# Patient Record
Sex: MALE | Race: WHITE | NOT HISPANIC OR LATINO | URBAN - METROPOLITAN AREA
[De-identification: names, ages, dates, MRNs, and addresses within clinical notes are randomized per-mention and may not be internally consistent; named-entity substitution may affect disease eponyms.]

---

## 2018-05-30 ENCOUNTER — OUTPATIENT (OUTPATIENT)
Dept: OUTPATIENT SERVICES | Facility: HOSPITAL | Age: 63
LOS: 1 days | Discharge: HOME | End: 2018-05-30

## 2018-05-30 DIAGNOSIS — M79.609 PAIN IN UNSPECIFIED LIMB: ICD-10-CM

## 2023-01-23 ENCOUNTER — APPOINTMENT (OUTPATIENT)
Dept: CARDIOLOGY | Facility: CLINIC | Age: 68
End: 2023-01-23
Payer: MEDICARE

## 2023-01-23 VITALS — HEIGHT: 74 IN | WEIGHT: 281.31 LBS | BODY MASS INDEX: 36.1 KG/M2

## 2023-01-23 VITALS — DIASTOLIC BLOOD PRESSURE: 78 MMHG | SYSTOLIC BLOOD PRESSURE: 130 MMHG

## 2023-01-23 DIAGNOSIS — E11.9 TYPE 2 DIABETES MELLITUS W/OUT COMPLICATIONS: ICD-10-CM

## 2023-01-23 DIAGNOSIS — G47.33 OBSTRUCTIVE SLEEP APNEA (ADULT) (PEDIATRIC): ICD-10-CM

## 2023-01-23 PROCEDURE — 99204 OFFICE O/P NEW MOD 45 MIN: CPT

## 2023-01-23 RX ORDER — FLUOXETINE HYDROCHLORIDE 20 MG/1
20 CAPSULE ORAL
Refills: 0 | Status: ACTIVE | COMMUNITY

## 2023-01-23 RX ORDER — GLIPIZIDE 10 MG/1
10 TABLET ORAL
Refills: 0 | Status: ACTIVE | COMMUNITY

## 2023-01-23 RX ORDER — RIVAROXABAN 20 MG/1
20 TABLET, FILM COATED ORAL
Refills: 0 | Status: ACTIVE | COMMUNITY

## 2023-01-23 RX ORDER — EMPAGLIFLOZIN 25 MG/1
25 TABLET, FILM COATED ORAL
Refills: 0 | Status: ACTIVE | COMMUNITY

## 2023-01-23 RX ORDER — QUINAPRIL HYDROCHLORIDE 10 MG/1
10 TABLET, FILM COATED ORAL
Refills: 0 | Status: DISCONTINUED | COMMUNITY
End: 2023-01-23

## 2023-01-23 RX ORDER — HYDROCHLOROTHIAZIDE 25 MG/1
25 TABLET ORAL
Refills: 0 | Status: ACTIVE | COMMUNITY

## 2023-01-23 RX ORDER — ATORVASTATIN CALCIUM 10 MG/1
10 TABLET, FILM COATED ORAL
Refills: 0 | Status: DISCONTINUED | COMMUNITY
End: 2023-01-23

## 2023-01-23 RX ORDER — INSULIN DEGLUDEC INJECTION 200 U/ML
200 INJECTION, SOLUTION SUBCUTANEOUS
Refills: 0 | Status: ACTIVE | COMMUNITY

## 2023-01-23 RX ORDER — LISINOPRIL 20 MG/1
20 TABLET ORAL
Refills: 0 | Status: ACTIVE | COMMUNITY

## 2023-01-23 RX ORDER — METFORMIN HYDROCHLORIDE 500 MG/1
500 TABLET, COATED ORAL
Refills: 0 | Status: ACTIVE | COMMUNITY

## 2023-01-23 RX ORDER — DULAGLUTIDE 0.75 MG/.5ML
0.75 INJECTION, SOLUTION SUBCUTANEOUS
Refills: 0 | Status: ACTIVE | COMMUNITY

## 2023-02-21 NOTE — HISTORY OF PRESENT ILLNESS
[FreeTextEntry1] : pt with PAF, mixed HLD, HTN, TYPE 2 DM, MORBID OBESITY, VAIBHAV ON CPAP, ANGINA. CUFF ACCURATE WITH OFFICE. Chronic low hdl: 32 \par \par EKG: 10/19: AFIB LVH  MS \par pt use to see dr. anne here to establish CV care. \par HDL 32 T, LDL 70\par CUFF ACCURATE WITH OFFICE. \par pt says bp at times was 105/67 at times. pt lost 40 lbs and going for colonoscopy and here for testing. \par pt denies any cp or sob and says walks one and mile to 2 miles/day. \par pt says in afib all the time, not symptomatic from it. \par bnp: 119 \par no bleeding issues. \par \par reviewed bnp up to 804 now vs 119 but no sob will see prior to next visit. \par \par pt with his Psychiatric had escitolopram started and combo can increase QT so told to stop. \par \par d/w psych and told to use not qt prolonging drugs.

## 2023-02-21 NOTE — DISCUSSION/SUMMARY
[FreeTextEntry1] : pt with afib ? permanent \par pt to get 2 d echo prior to next visit \par pt is stable of low risk colonoscopy \par hold xarelto 48 hours prior to procedure and restart day after procedure. \par get monitor after next visit to see if permanent or paf

## 2023-02-21 NOTE — PHYSICAL EXAM
[Normal Venous Pressure] : normal venous pressure [Normal S1, S2] : normal S1, S2 [Murmur] : murmur [Clear Lung Fields] : clear lung fields [Soft] : abdomen soft [No Edema] : no edema [de-identified] : RRR

## 2023-02-28 ENCOUNTER — APPOINTMENT (OUTPATIENT)
Dept: CARDIOLOGY | Facility: CLINIC | Age: 68
End: 2023-02-28
Payer: MEDICARE

## 2023-02-28 PROCEDURE — 93306 TTE W/DOPPLER COMPLETE: CPT

## 2023-06-07 NOTE — CARDIOLOGY SUMMARY
[de-identified] : 2/28/23:EF: 61%, DD2, mild LAE, mild AMA, mild AR. Pt in afib e' not measured appears to have elevated LAP

## 2023-06-07 NOTE — PHYSICAL EXAM
[Normal Venous Pressure] : normal venous pressure [Normal S1, S2] : normal S1, S2 [Murmur] : murmur [Clear Lung Fields] : clear lung fields [Soft] : abdomen soft [No Edema] : no edema [de-identified] : RRR

## 2023-06-07 NOTE — HISTORY OF PRESENT ILLNESS
[FreeTextEntry1] : pt with PAF, mixed HLD, HTN, TYPE 2 DM, MORBID OBESITY, VAIBHAV ON CPAP, ANGINA. CUFF ACCURATE WITH OFFICE. Chronic low hdl: 32 \par \par EKG: 10/19: AFIB LVH  MS \par pt use to see dr. anne here to establish CV care. \par HDL 32 T, LDL 70\par CUFF ACCURATE WITH OFFICE. \par pt says bp at times was 105/67 at times. pt lost 40 lbs and going for colonoscopy and here for testing. \par pt denies any cp or sob and says walks one and mile to 2 miles/day. \par pt says in afib all the time, not symptomatic from it. \par bnp: 119 \par no bleeding issues. \par \par reviewed bnp up to 804 now vs 119 but no sob will see prior to next visit. \par \par pt with his pscyh had escitolopram started and combo can increase QT so told to stop. \par \par d/w psych and told to use not qt prolonging drugs. \par \par 23:\par 23:EF: 61%, DD2, mild LAE, mild AMA, mild AR. Pt in afib e' not measured appears to have elevated LAP

## 2023-06-09 ENCOUNTER — APPOINTMENT (OUTPATIENT)
Dept: CARDIOLOGY | Facility: CLINIC | Age: 68
End: 2023-06-09
Payer: MEDICARE

## 2023-07-19 ENCOUNTER — APPOINTMENT (OUTPATIENT)
Dept: CARDIOLOGY | Facility: CLINIC | Age: 68
End: 2023-07-19
Payer: MEDICARE

## 2023-07-19 VITALS — SYSTOLIC BLOOD PRESSURE: 126 MMHG | HEART RATE: 90 BPM | DIASTOLIC BLOOD PRESSURE: 78 MMHG

## 2023-07-19 VITALS — BODY MASS INDEX: 37.64 KG/M2 | HEIGHT: 74 IN | WEIGHT: 293.25 LBS

## 2023-07-19 DIAGNOSIS — E11.9 TYPE 2 DIABETES MELLITUS W/OUT COMPLICATIONS: ICD-10-CM

## 2023-07-19 PROCEDURE — 99214 OFFICE O/P EST MOD 30 MIN: CPT

## 2023-07-19 PROCEDURE — ZZZZZ: CPT

## 2023-07-19 NOTE — CARDIOLOGY SUMMARY
[de-identified] : 2/28/23:EF: 61%, DD2, mild LAE, mild AMA, mild AR. Pt in afib e' not measured appears to have elevated LAP

## 2023-07-19 NOTE — HISTORY OF PRESENT ILLNESS
[FreeTextEntry1] : pt with PERSISTENT AFIB,  HLD, HTN, TYPE 2 DM, MORBID OBESITY, VAIBHAV ON CPAP, ANGINA. CUFF ACCURATE WITH OFFICE. Chronic low hdl: 32  DD2, SHANIKA. \par \par EKG: 10/19: AFIB LVH  MS . \par HDL 32 T, LDL 70\par pt says bp at times was 105/67 at times. pt lost 40 lbs and going for colonoscopy and here for testing. \par pt denies any cp or sob and says walks one mile to 2 miles/day. \par pt says in afib all the time, not symptomatic from it. \par bnp: 119 \par no bleeding issues. \par \par reviewed bnp up to 804 now vs 119 but no sob will see prior to next visit. \par pt with his pscyhiatrist had escitolopram started and combo with sotalol can increase QT so told to stop. \par \par d/w psych and told to use not qt prolonging drugs. \par \par 23:\par 23:EF: 61%, DD2, mild LAE, mild AMA, mild AR. Pt in afib e' not measured appears to have elevated LAP\par T LDL 69 HDL 28 \par Patient had toothache and BP was high today. pt says intermittent sob. pt with afib seems permanent afib and on sotalol. \par pt not very active.

## 2023-07-19 NOTE — DISCUSSION/SUMMARY
[FreeTextEntry1] : pt with afib ? permanent \par get monitor after next visit to see if permanent or paf  as seems to be permanent afib and if so, will stop sotalol and start metoprolol on patient \par also with SHANIKA. \par low HDL \par get monitor \par edema start lasix as JVD. \par get monitor \par lasix 40 mg po qd \par get bmp bnp a1c\par diet control

## 2023-07-19 NOTE — PHYSICAL EXAM
[Normal Venous Pressure] : normal venous pressure [Normal S1, S2] : normal S1, S2 [Murmur] : murmur [Clear Lung Fields] : clear lung fields [Soft] : abdomen soft [No Edema] : no edema [Elevated JVD ____cm] : elevated JVD ~Vcm [Edema ___] : edema [unfilled] [de-identified] : RRR

## 2023-08-16 ENCOUNTER — APPOINTMENT (OUTPATIENT)
Dept: CARDIOLOGY | Facility: CLINIC | Age: 68
End: 2023-08-16
Payer: MEDICARE

## 2023-08-16 VITALS — HEART RATE: 60 BPM | SYSTOLIC BLOOD PRESSURE: 120 MMHG | DIASTOLIC BLOOD PRESSURE: 70 MMHG

## 2023-08-16 VITALS — HEIGHT: 74 IN | BODY MASS INDEX: 36.57 KG/M2 | WEIGHT: 285 LBS

## 2023-08-16 DIAGNOSIS — I10 ESSENTIAL (PRIMARY) HYPERTENSION: ICD-10-CM

## 2023-08-16 DIAGNOSIS — E78.2 MIXED HYPERLIPIDEMIA: ICD-10-CM

## 2023-08-16 DIAGNOSIS — E66.01 MORBID (SEVERE) OBESITY DUE TO EXCESS CALORIES: ICD-10-CM

## 2023-08-16 DIAGNOSIS — I48.21 PERMANENT ATRIAL FIBRILLATION: ICD-10-CM

## 2023-08-16 DIAGNOSIS — I48.91 UNSPECIFIED ATRIAL FIBRILLATION: ICD-10-CM

## 2023-08-16 DIAGNOSIS — I20.9 ANGINA PECTORIS, UNSPECIFIED: ICD-10-CM

## 2023-08-16 DIAGNOSIS — Z00.00 ENCOUNTER FOR GENERAL ADULT MEDICAL EXAMINATION W/OUT ABNORMAL FINDINGS: ICD-10-CM

## 2023-08-16 PROCEDURE — 99214 OFFICE O/P EST MOD 30 MIN: CPT

## 2023-08-16 RX ORDER — SOTALOL HYDROCHLORIDE 80 MG/1
80 TABLET ORAL
Refills: 0 | Status: DISCONTINUED | COMMUNITY
End: 2023-08-16

## 2023-08-16 NOTE — PHYSICAL EXAM
[Normal Venous Pressure] : normal venous pressure [Elevated JVD ____cm] : elevated JVD ~Vcm [Normal S1, S2] : normal S1, S2 [Murmur] : murmur [Clear Lung Fields] : clear lung fields [Soft] : abdomen soft [No Edema] : no edema [Edema ___] : edema [unfilled] [de-identified] : RRR

## 2023-08-16 NOTE — DISCUSSION/SUMMARY
[FreeTextEntry1] : permanent afib  get monitor after next visit to see if permanent or paf  as seems to be permanent afib and if so, will stop sotalol and start metoprolol on patient    8/16/23: Stop Sotalol as patient did not stop  and say sob if worsens will consider ischemic w/u.  reduce lasix to 20 mg po daily if still lightheaded call me.  continue jardiance

## 2023-08-16 NOTE — CARDIOLOGY SUMMARY
[de-identified] : 2/28/23:EF: 61%, DD2, mild LAE, mild AMA, mild AR. Pt in afib e' not measured appears to have elevated LAP

## 2023-08-16 NOTE — HISTORY OF PRESENT ILLNESS
[FreeTextEntry1] : pt with PERSISTENT AFIB,  HLD, HTN, TYPE 2 DM, MORBID OBESITY, VAIBHAV ON CPAP, ANGINA. CUFF ACCURATE WITH OFFICE. Chronic low hdl: 32  DD2, SHANIKA.   EKG: 10/19: AFIB LVH  MS .  HDL 32 T, LDL 70 pt says walks one mile to 2 miles/day.   reviewed bnp up to 804 now vs 119 but no sob will see prior to next visit.  pt with his pscyhiatrist had escitolopram started and combo with sotalol can increase QT so told to stop.  d/w psych and told to not use qt prolonging drugs.   23: 23:EF: 61%, DD2, mild LAE, mild AMA, mild AR. Pt in afib e' not measured appears to have elevated LAP T LDL 69 HDL 28  Patient had toothache and BP was high today. pt says intermittent sob. pt with afib seems permanent afib and on sotalol.  pt not very active.   23: MCOT: AFIB BURDEN 100% on sotalol. 47 to 120 with average hr of 77 bpm with 2.4 second pause. Patient did not stop Sotalol.  pt can feel his heart quivering and says sob at times with exertion and sometimes at rest, pt feeling lightheaded from sitting position and once in a while upon standing.  pt says

## 2023-08-18 RX ORDER — SERTRALINE HYDROCHLORIDE 50 MG/1
50 TABLET, FILM COATED ORAL
Refills: 0 | Status: ACTIVE | COMMUNITY
Start: 2023-08-18

## 2023-09-12 ENCOUNTER — RX CHANGE (OUTPATIENT)
Age: 68
End: 2023-09-12

## 2023-09-12 RX ORDER — METOPROLOL TARTRATE 50 MG/1
50 TABLET, FILM COATED ORAL
Qty: 60 | Refills: 1 | Status: DISCONTINUED | COMMUNITY
Start: 2023-08-16 | End: 2023-09-12

## 2023-10-24 RX ORDER — FUROSEMIDE 40 MG/1
40 TABLET ORAL DAILY
Qty: 90 | Refills: 3 | Status: ACTIVE | COMMUNITY
Start: 2023-07-19 | End: 1900-01-01

## 2023-12-19 RX ORDER — METOPROLOL TARTRATE 50 MG/1
50 TABLET, FILM COATED ORAL
Qty: 180 | Refills: 3 | Status: ACTIVE | COMMUNITY
Start: 1900-01-01 | End: 1900-01-01

## 2024-01-04 NOTE — CARDIOLOGY SUMMARY
[de-identified] : 2/28/23:EF: 61%, DD2, mild LAE, mild AMA, mild AR. Pt in afib e' not measured appears to have elevated LAP

## 2024-01-04 NOTE — HISTORY OF PRESENT ILLNESS
[FreeTextEntry1] : pt with PERSISTENT AFIB,  HLD, HTN, TYPE 2 DM, MORBID OBESITY, VAIBHAV ON CPAP, ANGINA. CUFF ACCURATE WITH OFFICE. Chronic low hdl: 32  DD2, SHANIKA.   EKG: 10/19: AFIB LVH  MS .  HDL 32 T, LDL 70 pt says walks one mile to 2 miles/day.   reviewed bnp up to 804 now vs 119 but no sob will see prior to next visit.  pt with his pscyhiatrist had escitolopram started and combo with sotalol can increase QT so told to stop.  d/w psych and told to not use qt prolonging drugs.   23: 23:EF: 61%, DD2, mild LAE, mild AMA, mild AR. Pt in afib e' not measured appears to have elevated LAP T LDL 69 HDL 28  Patient had toothache and BP was high today. pt says intermittent sob. pt with afib seems permanent afib and on sotalol.  pt not very active.   23: MCOT: AFIB BURDEN 100% on sotalol. 47 to 120 with average hr of 77 bpm with 2.4 second pause. Patient did not stop Sotalol.  pt can feel his heart quivering and says sob at times with exertion and sometimes at rest, pt feeling lightheaded from sitting position and once in a while upon standing.  pt says   24:

## 2024-01-09 ENCOUNTER — APPOINTMENT (OUTPATIENT)
Dept: CARDIOLOGY | Facility: CLINIC | Age: 69
End: 2024-01-09

## 2024-12-04 ENCOUNTER — RX RENEWAL (OUTPATIENT)
Age: 69
End: 2024-12-04

## 2025-02-13 ENCOUNTER — APPOINTMENT (OUTPATIENT)
Dept: OTOLARYNGOLOGY | Facility: CLINIC | Age: 70
End: 2025-02-13
Payer: MEDICARE

## 2025-02-13 VITALS — HEIGHT: 74 IN | BODY MASS INDEX: 39.4 KG/M2 | WEIGHT: 307 LBS

## 2025-02-13 DIAGNOSIS — E04.1 NONTOXIC SINGLE THYROID NODULE: ICD-10-CM

## 2025-02-13 PROCEDURE — 99205 OFFICE O/P NEW HI 60 MIN: CPT | Mod: 25

## 2025-02-13 PROCEDURE — 31575 DIAGNOSTIC LARYNGOSCOPY: CPT

## 2025-03-09 PROCEDURE — 93010 ELECTROCARDIOGRAM REPORT: CPT

## 2025-03-10 ENCOUNTER — OUTPATIENT (OUTPATIENT)
Dept: OUTPATIENT SERVICES | Facility: HOSPITAL | Age: 70
LOS: 1 days | End: 2025-03-10
Payer: MEDICARE

## 2025-03-10 VITALS
SYSTOLIC BLOOD PRESSURE: 151 MMHG | HEART RATE: 71 BPM | DIASTOLIC BLOOD PRESSURE: 76 MMHG | OXYGEN SATURATION: 96 % | TEMPERATURE: 98 F | RESPIRATION RATE: 16 BRPM | HEIGHT: 74 IN | WEIGHT: 314.82 LBS

## 2025-03-10 DIAGNOSIS — E04.1 NONTOXIC SINGLE THYROID NODULE: ICD-10-CM

## 2025-03-10 DIAGNOSIS — Z01.818 ENCOUNTER FOR OTHER PREPROCEDURAL EXAMINATION: ICD-10-CM

## 2025-03-10 DIAGNOSIS — Z98.890 OTHER SPECIFIED POSTPROCEDURAL STATES: Chronic | ICD-10-CM

## 2025-03-10 DIAGNOSIS — Z95.0 PRESENCE OF CARDIAC PACEMAKER: Chronic | ICD-10-CM

## 2025-03-10 LAB
A1C WITH ESTIMATED AVERAGE GLUCOSE RESULT: 6.5 % — HIGH (ref 4–5.6)
ALBUMIN SERPL ELPH-MCNC: 4.4 G/DL — SIGNIFICANT CHANGE UP (ref 3.5–5.2)
ALP SERPL-CCNC: 54 U/L — SIGNIFICANT CHANGE UP (ref 30–115)
ALT FLD-CCNC: 26 U/L — SIGNIFICANT CHANGE UP (ref 0–41)
ANION GAP SERPL CALC-SCNC: 10 MMOL/L — SIGNIFICANT CHANGE UP (ref 7–14)
APTT BLD: 39.7 SEC — HIGH (ref 27–39.2)
AST SERPL-CCNC: 19 U/L — SIGNIFICANT CHANGE UP (ref 0–41)
BILIRUB SERPL-MCNC: 0.5 MG/DL — SIGNIFICANT CHANGE UP (ref 0.2–1.2)
BUN SERPL-MCNC: 18 MG/DL — SIGNIFICANT CHANGE UP (ref 10–20)
CALCIUM SERPL-MCNC: 9.3 MG/DL — SIGNIFICANT CHANGE UP (ref 8.4–10.5)
CHLORIDE SERPL-SCNC: 105 MMOL/L — SIGNIFICANT CHANGE UP (ref 98–110)
CO2 SERPL-SCNC: 24 MMOL/L — SIGNIFICANT CHANGE UP (ref 17–32)
CREAT SERPL-MCNC: 1.2 MG/DL — SIGNIFICANT CHANGE UP (ref 0.7–1.5)
EGFR: 66 ML/MIN/1.73M2 — SIGNIFICANT CHANGE UP
EGFR: 66 ML/MIN/1.73M2 — SIGNIFICANT CHANGE UP
ESTIMATED AVERAGE GLUCOSE: 140 MG/DL — HIGH (ref 68–114)
GLUCOSE SERPL-MCNC: 101 MG/DL — HIGH (ref 70–99)
HCT VFR BLD CALC: 47.9 % — SIGNIFICANT CHANGE UP (ref 42–52)
HGB BLD-MCNC: 15.3 G/DL — SIGNIFICANT CHANGE UP (ref 14–18)
INR BLD: 1.58 RATIO — HIGH (ref 0.65–1.3)
MCHC RBC-ENTMCNC: 29.5 PG — SIGNIFICANT CHANGE UP (ref 27–31)
MCHC RBC-ENTMCNC: 31.9 G/DL — LOW (ref 32–37)
MCV RBC AUTO: 92.3 FL — SIGNIFICANT CHANGE UP (ref 80–94)
NRBC BLD AUTO-RTO: 0 /100 WBCS — SIGNIFICANT CHANGE UP (ref 0–0)
PLATELET # BLD AUTO: 160 K/UL — SIGNIFICANT CHANGE UP (ref 130–400)
PMV BLD: 10.5 FL — HIGH (ref 7.4–10.4)
POTASSIUM SERPL-MCNC: 4.7 MMOL/L — SIGNIFICANT CHANGE UP (ref 3.5–5)
POTASSIUM SERPL-SCNC: 4.7 MMOL/L — SIGNIFICANT CHANGE UP (ref 3.5–5)
PROT SERPL-MCNC: 6.8 G/DL — SIGNIFICANT CHANGE UP (ref 6–8)
PROTHROM AB SERPL-ACNC: 18.8 SEC — HIGH (ref 9.95–12.87)
RBC # BLD: 5.19 M/UL — SIGNIFICANT CHANGE UP (ref 4.7–6.1)
RBC # FLD: 14.5 % — SIGNIFICANT CHANGE UP (ref 11.5–14.5)
SODIUM SERPL-SCNC: 139 MMOL/L — SIGNIFICANT CHANGE UP (ref 135–146)
WBC # BLD: 8.17 K/UL — SIGNIFICANT CHANGE UP (ref 4.8–10.8)
WBC # FLD AUTO: 8.17 K/UL — SIGNIFICANT CHANGE UP (ref 4.8–10.8)

## 2025-03-10 PROCEDURE — 83036 HEMOGLOBIN GLYCOSYLATED A1C: CPT

## 2025-03-10 PROCEDURE — 99214 OFFICE O/P EST MOD 30 MIN: CPT | Mod: 25

## 2025-03-10 PROCEDURE — 85027 COMPLETE CBC AUTOMATED: CPT

## 2025-03-10 PROCEDURE — 80053 COMPREHEN METABOLIC PANEL: CPT

## 2025-03-10 PROCEDURE — 36415 COLL VENOUS BLD VENIPUNCTURE: CPT

## 2025-03-10 PROCEDURE — 85730 THROMBOPLASTIN TIME PARTIAL: CPT

## 2025-03-10 PROCEDURE — 85610 PROTHROMBIN TIME: CPT

## 2025-03-10 PROCEDURE — 93005 ELECTROCARDIOGRAM TRACING: CPT

## 2025-03-10 RX ORDER — ATORVASTATIN CALCIUM 80 MG/1
1 TABLET, FILM COATED ORAL
Refills: 0 | DISCHARGE

## 2025-03-10 RX ORDER — TIRZEPATIDE 7.5 MG/.5ML
7.5 INJECTION, SOLUTION SUBCUTANEOUS
Refills: 0 | DISCHARGE

## 2025-03-10 RX ORDER — LISINOPRIL 5 MG/1
1 TABLET ORAL
Refills: 0 | DISCHARGE

## 2025-03-10 RX ORDER — ARIPIPRAZOLE 2 MG/1
1 TABLET ORAL
Refills: 0 | DISCHARGE

## 2025-03-10 RX ORDER — FUROSEMIDE 10 MG/ML
1 INJECTION INTRAMUSCULAR; INTRAVENOUS
Refills: 0 | DISCHARGE

## 2025-03-10 RX ORDER — RIVAROXABAN 10 MG/1
1 TABLET, FILM COATED ORAL
Refills: 0 | DISCHARGE

## 2025-03-10 RX ORDER — EMPAGLIFLOZIN 25 MG/1
1 TABLET, FILM COATED ORAL
Refills: 0 | DISCHARGE

## 2025-03-10 RX ORDER — SERTRALINE 100 MG/1
1 TABLET, FILM COATED ORAL
Refills: 0 | DISCHARGE

## 2025-03-10 RX ORDER — INSULIN DEGLUDEC 100 U/ML
32 INJECTION, SOLUTION SUBCUTANEOUS
Refills: 0 | DISCHARGE

## 2025-03-10 RX ORDER — METFORMIN HYDROCHLORIDE 850 MG/1
1 TABLET ORAL
Refills: 0 | DISCHARGE

## 2025-03-10 RX ORDER — TAMSULOSIN HYDROCHLORIDE 0.4 MG/1
1 CAPSULE ORAL
Refills: 0 | DISCHARGE

## 2025-03-10 RX ORDER — AMIODARONE HYDROCHLORIDE 50 MG/ML
1 INJECTION, SOLUTION INTRAVENOUS
Refills: 0 | DISCHARGE

## 2025-03-10 NOTE — H&P PST ADULT - HISTORY OF PRESENT ILLNESS
69 year old male here for LEFT HEMITHYROIDECTOMY, after seeing a nodule 2 years ago and recently had it change shape, and had a bx which was negative according to patient , but they decided to remove it d/t the changing shape. Patient denies sx  fos=1 + sob denies chest pain sob palp  denies recent uri or uti  Anesthesia Alert  YES--Difficult Airway  NO--History of neck surgery or radiation  NO--Limited ROM of neck  NO--History of Malignant hyperthermia  NO--Personal or family history of Pseudocholinesterase deficiency  NO--Prior Anesthesia Complication  NO--Latex Allergy  NO--Loose teeth  NO--History of Rheumatoid Arthritis  YES- BLEEDING RISK-on Xarelto  YES--Other_____sleep apnea    As per patient, this is their complete medical and surgical history, including medications both prescribed or over the counter.  Patient verbalized understanding of instructions and was given the opportunity to ask questions and have them answered.    25.95 points  The higher the score (maximum 58.2), the higher the functional status.  5.93 METs    1 points  RCRI Score  6.0 %  Risk of major cardiac event

## 2025-03-10 NOTE — H&P PST ADULT - NSICDXPASTMEDICALHX_GEN_ALL_CORE_FT
PAST MEDICAL HISTORY:  Bradycardia     Chronic atrial fibrillation     Depression     Difficulty in urination     DM (diabetes mellitus)     HTN (hypertension)     Pacemaker     Super obese

## 2025-03-10 NOTE — H&P PST ADULT - GENITOURINARY MALE
Pt intubated and sedated
normal external genitalia/no hernia/no discharge/no mass/no tenderness/no ulcer/normal/no penile lesion/no palpable testicular mass/no scrotal mass

## 2025-03-11 DIAGNOSIS — Z01.818 ENCOUNTER FOR OTHER PREPROCEDURAL EXAMINATION: ICD-10-CM

## 2025-03-11 DIAGNOSIS — E04.1 NONTOXIC SINGLE THYROID NODULE: ICD-10-CM

## 2025-03-28 PROBLEM — I10 ESSENTIAL (PRIMARY) HYPERTENSION: Chronic | Status: ACTIVE | Noted: 2025-03-10

## 2025-03-28 PROBLEM — F32.A DEPRESSION, UNSPECIFIED: Chronic | Status: ACTIVE | Noted: 2025-03-10

## 2025-05-05 ENCOUNTER — OUTPATIENT (OUTPATIENT)
Dept: OUTPATIENT SERVICES | Facility: HOSPITAL | Age: 70
LOS: 1 days | End: 2025-05-05
Payer: MEDICARE

## 2025-05-05 VITALS
DIASTOLIC BLOOD PRESSURE: 69 MMHG | HEIGHT: 74 IN | WEIGHT: 312.17 LBS | OXYGEN SATURATION: 94 % | RESPIRATION RATE: 15 BRPM | HEART RATE: 58 BPM | SYSTOLIC BLOOD PRESSURE: 133 MMHG | TEMPERATURE: 98 F

## 2025-05-05 DIAGNOSIS — Z01.818 ENCOUNTER FOR OTHER PREPROCEDURAL EXAMINATION: ICD-10-CM

## 2025-05-05 DIAGNOSIS — Z95.0 PRESENCE OF CARDIAC PACEMAKER: Chronic | ICD-10-CM

## 2025-05-05 DIAGNOSIS — Z98.890 OTHER SPECIFIED POSTPROCEDURAL STATES: Chronic | ICD-10-CM

## 2025-05-05 DIAGNOSIS — E04.1 NONTOXIC SINGLE THYROID NODULE: ICD-10-CM

## 2025-05-05 PROBLEM — R00.1 BRADYCARDIA, UNSPECIFIED: Chronic | Status: ACTIVE | Noted: 2025-03-10

## 2025-05-05 PROBLEM — I48.20 CHRONIC ATRIAL FIBRILLATION, UNSPECIFIED: Chronic | Status: ACTIVE | Noted: 2025-03-10

## 2025-05-05 PROBLEM — R39.198 OTHER DIFFICULTIES WITH MICTURITION: Chronic | Status: ACTIVE | Noted: 2025-03-10

## 2025-05-05 PROBLEM — E66.9 OBESITY, UNSPECIFIED: Chronic | Status: ACTIVE | Noted: 2025-03-10

## 2025-05-05 PROBLEM — E11.9 TYPE 2 DIABETES MELLITUS WITHOUT COMPLICATIONS: Chronic | Status: ACTIVE | Noted: 2025-03-10

## 2025-05-05 PROCEDURE — 99214 OFFICE O/P EST MOD 30 MIN: CPT | Mod: 25

## 2025-05-05 NOTE — H&P PST ADULT - HISTORY OF PRESENT ILLNESS
69 year old male here for LEFT HEMITHYROIDECTOMY ,states had a routine checkup with his endocrinologist who had been keeping a watch on it. Just recently the shape had changed and patient was referred for above procedure. Patient states he has not been having any sx, but he went to cardiology , had an ablation and pacemaker placed , he is still feeling light headed and out of breath and some chest pain. Patient states cardiology wants to do a cath.  FOS= 1-2  c/o sob with climbing steps  denies recent uri or uti.    Anesthesia Alert  YES---Difficult Airway  NO--History of neck surgery or radiation  NO--Limited ROM of neck  NO--History of Malignant hyperthermia  NO--Personal or family history of Pseudocholinesterase deficiency  NO--Prior Anesthesia Complication  NO--Latex Allergy  NO--Loose teeth  NO--History of Rheumatoid Arthritis  YES--VAIBHAV  YES- BLEEDING RISK- on xarelto  NO--Other_____    As per patient, this is their complete medical and surgical history, including medications both prescribed or over the counter.  Patient verbalized understanding of instructions and was given the opportunity to ask questions and have them answered.  18.95 points  The higher the score (maximum 58.2), the higher the functional status.  5.07 MET    2 points  RCRI Score  10.1 %  Risk of major cardiac event

## 2025-05-06 DIAGNOSIS — Z01.818 ENCOUNTER FOR OTHER PREPROCEDURAL EXAMINATION: ICD-10-CM

## 2025-05-06 DIAGNOSIS — E04.1 NONTOXIC SINGLE THYROID NODULE: ICD-10-CM

## 2025-05-19 ENCOUNTER — APPOINTMENT (OUTPATIENT)
Dept: OTOLARYNGOLOGY | Facility: HOSPITAL | Age: 70
End: 2025-05-19

## 2025-05-28 ENCOUNTER — APPOINTMENT (OUTPATIENT)
Dept: OTOLARYNGOLOGY | Facility: CLINIC | Age: 70
End: 2025-05-28